# Patient Record
Sex: FEMALE | Race: WHITE | NOT HISPANIC OR LATINO | URBAN - METROPOLITAN AREA
[De-identification: names, ages, dates, MRNs, and addresses within clinical notes are randomized per-mention and may not be internally consistent; named-entity substitution may affect disease eponyms.]

---

## 2017-11-20 ENCOUNTER — FOLLOW UP ESTABLISHED (OUTPATIENT)
Dept: URBAN - METROPOLITAN AREA CLINIC 51 | Facility: CLINIC | Age: 77
End: 2017-11-20
Payer: MEDICARE

## 2017-11-20 DIAGNOSIS — H25.13 TEAR FILM INSUFFICIENCY OF BILATERAL LACRIMAL GLANDS: Primary | ICD-10-CM

## 2017-11-20 DIAGNOSIS — H52.31 ANISOMETROPIA: ICD-10-CM

## 2017-11-20 DIAGNOSIS — H43.813 VITREOUS DEGENERATION, BILATERAL: ICD-10-CM

## 2017-11-20 DIAGNOSIS — H16.223 KERATOCONJUNCT SICCA, NOT SPECIFIED AS SJOGREN'S, BILATERAL: ICD-10-CM

## 2017-11-20 PROCEDURE — 92134 CPTRZ OPH DX IMG PST SGM RTA: CPT | Performed by: OPTOMETRIST

## 2017-11-20 PROCEDURE — 92014 COMPRE OPH EXAM EST PT 1/>: CPT | Performed by: OPTOMETRIST

## 2017-11-20 ASSESSMENT — INTRAOCULAR PRESSURE
OS: 16
OD: 15

## 2017-11-20 ASSESSMENT — VISUAL ACUITY
OS: 20/30
OD: 20/30

## 2021-07-08 ENCOUNTER — OFFICE VISIT (OUTPATIENT)
Dept: URBAN - METROPOLITAN AREA CLINIC 51 | Facility: CLINIC | Age: 81
End: 2021-07-08
Payer: MEDICARE

## 2021-07-08 DIAGNOSIS — H25.813 COMBINED FORMS OF AGE-RELATED CATARACT, BILATERAL: Primary | ICD-10-CM

## 2021-07-08 DIAGNOSIS — H04.123 TEAR FILM INSUFFICIENCY OF BILATERAL LACRIMAL GLANDS: ICD-10-CM

## 2021-07-08 DIAGNOSIS — H43.313 VITREOUS MEMBRANES AND STRANDS, BILATERAL: ICD-10-CM

## 2021-07-08 PROCEDURE — 99204 OFFICE O/P NEW MOD 45 MIN: CPT | Performed by: OPTOMETRIST

## 2021-07-08 PROCEDURE — 92134 CPTRZ OPH DX IMG PST SGM RTA: CPT | Performed by: OPTOMETRIST

## 2021-07-08 ASSESSMENT — KERATOMETRY
OD: 44.15
OS: 43.58

## 2021-07-08 ASSESSMENT — INTRAOCULAR PRESSURE
OS: 15
OD: 14

## 2021-07-08 ASSESSMENT — VISUAL ACUITY
OD: 20/30
OS: 20/30

## 2021-07-08 NOTE — IMPRESSION/PLAN
Impression: Combined forms of age-related cataract, bilateral: H25.813. Plan: Cataracts are visually significant and effecting vision, but patient is satisfied with current level of vision and declined to have cataract surgery. Ed patient that changing glasses will not completely improve vision. Discussed risk / benefit of surgery with patient. Pt defers sx at this time and wishes to monitor. Patient wishes to do monovision once ready to have cataract surgery. RTC if notice changes in vision.

## 2021-07-08 NOTE — IMPRESSION/PLAN
Impression: Tear film insufficiency of bilateral lacrimal glands: H04.123. Plan: Patient is primarily asymptomatic. Recommend AT's at least 1-2 times a day or more OU.

## 2021-07-08 NOTE — IMPRESSION/PLAN
Impression: Anisometropia
    natural monovision allowing vision without specs to function Plan: Patient deferred SRx. Continue with current glasses.